# Patient Record
Sex: FEMALE | Race: WHITE | NOT HISPANIC OR LATINO | Employment: FULL TIME | URBAN - METROPOLITAN AREA
[De-identification: names, ages, dates, MRNs, and addresses within clinical notes are randomized per-mention and may not be internally consistent; named-entity substitution may affect disease eponyms.]

---

## 2017-02-13 ENCOUNTER — HOSPITAL ENCOUNTER (OUTPATIENT)
Dept: LAB | Facility: CLINIC | Age: 62
Discharge: HOME | End: 2017-02-13
Admitting: INTERNAL MEDICINE
Payer: COMMERCIAL

## 2017-02-13 DIAGNOSIS — Z00.5 EXAMINATION OF POTENTIAL DONOR OF ORGAN AND TISSUE: Primary | ICD-10-CM

## 2017-02-13 LAB
ABO + RH BLD: NORMAL
ABO + RH BLD: NORMAL
ALBUMIN SERPL-MCNC: 3.6 G/DL (ref 3.4–5)
ALBUMIN UR-MCNC: NEGATIVE MG/DL
ALP SERPL-CCNC: 136 U/L (ref 40–150)
ALT SERPL W P-5'-P-CCNC: 22 U/L (ref 0–50)
ANION GAP SERPL CALCULATED.3IONS-SCNC: 8 MMOL/L (ref 3–14)
APPEARANCE UR: CLEAR
APTT PPP: 26 SEC (ref 22–37)
AST SERPL W P-5'-P-CCNC: 17 U/L (ref 0–45)
BASOPHILS # BLD AUTO: 0 10E9/L (ref 0–0.2)
BASOPHILS NFR BLD AUTO: 0.2 %
BILIRUB SERPL-MCNC: 0.4 MG/DL (ref 0.2–1.3)
BILIRUB UR QL STRIP: NEGATIVE
BLOOD BANK CMNT PATIENT-IMP: NORMAL
BUN SERPL-MCNC: 20 MG/DL (ref 7–30)
CALCIUM SERPL-MCNC: 8.5 MG/DL (ref 8.5–10.1)
CHLORIDE SERPL-SCNC: 108 MMOL/L (ref 94–109)
CHOLEST SERPL-MCNC: 169 MG/DL
CO2 SERPL-SCNC: 26 MMOL/L (ref 20–32)
COLLECT DURATION TIME UR: 24 H
COLLECT DURATION TIME UR: 24 H
COLOR UR AUTO: YELLOW
CREAT 24H UR-MRATE: 1.27 G/(24.H) (ref 0.8–1.8)
CREAT CL 24H UR+SERPL-VRATE: 106 ML/MIN
CREAT CL/1.73 SQ M 24H UR+SERPL-ARVRAT: 99 ML/MIN/1.7M2 (ref 100–160)
CREAT SERPL-MCNC: 0.83 MG/DL (ref 0.52–1.04)
CREAT SERPL-MCNC: 0.83 MG/DL (ref 0.52–1.04)
CREAT UR-MCNC: 95 MG/DL
CREAT UR-MCNC: 98 MG/DL
DIFFERENTIAL METHOD BLD: NORMAL
EOSINOPHIL # BLD AUTO: 0.2 10E9/L (ref 0–0.7)
EOSINOPHIL NFR BLD AUTO: 3.6 %
ERYTHROCYTE [DISTWIDTH] IN BLOOD BY AUTOMATED COUNT: 14.9 % (ref 10–15)
GFR SERPL CREATININE-BSD FRML MDRD: 70 ML/MIN/1.7M2
GLUCOSE SERPL-MCNC: 96 MG/DL (ref 70–99)
GLUCOSE UR STRIP-MCNC: NEGATIVE MG/DL
HBA1C MFR BLD: 6.2 % (ref 4.3–6)
HCG UR QL: NEGATIVE
HCT VFR BLD AUTO: 40.1 % (ref 35–47)
HDLC SERPL-MCNC: 56 MG/DL
HEIGHT IN CM: 167 CM
HGB BLD-MCNC: 12.9 G/DL (ref 11.7–15.7)
HGB UR QL STRIP: ABNORMAL
IMM GRANULOCYTES # BLD: 0 10E9/L (ref 0–0.4)
IMM GRANULOCYTES NFR BLD: 0.2 %
INR PPP: 0.96 (ref 0.86–1.14)
KETONES UR STRIP-MCNC: NEGATIVE MG/DL
LDLC SERPL CALC-MCNC: 99 MG/DL
LEUKOCYTE ESTERASE UR QL STRIP: ABNORMAL
LYMPHOCYTES # BLD AUTO: 1.4 10E9/L (ref 0.8–5.3)
LYMPHOCYTES NFR BLD AUTO: 25.1 %
MAGNESIUM SERPL-MCNC: 2.1 MG/DL (ref 1.6–2.3)
MCH RBC QN AUTO: 29.1 PG (ref 26.5–33)
MCHC RBC AUTO-ENTMCNC: 32.2 G/DL (ref 31.5–36.5)
MCV RBC AUTO: 91 FL (ref 78–100)
MONOCYTES # BLD AUTO: 0.5 10E9/L (ref 0–1.3)
MONOCYTES NFR BLD AUTO: 8.6 %
MUCOUS THREADS #/AREA URNS LPF: PRESENT /LPF
NEUTROPHILS # BLD AUTO: 3.5 10E9/L (ref 1.6–8.3)
NEUTROPHILS NFR BLD AUTO: 62.3 %
NITRATE UR QL: NEGATIVE
NONHDLC SERPL-MCNC: 113 MG/DL
NRBC # BLD AUTO: 0 10*3/UL
NRBC BLD AUTO-RTO: 0 /100
PH UR STRIP: 5.5 PH (ref 5–7)
PHOSPHATE SERPL-MCNC: 2.9 MG/DL (ref 2.5–4.5)
PLATELET # BLD AUTO: 231 10E9/L (ref 150–450)
POTASSIUM SERPL-SCNC: 3.9 MMOL/L (ref 3.4–5.3)
PROT 24H UR-MRATE: 0.09 G/(24.H) (ref 0.04–0.23)
PROT SERPL-MCNC: 6.9 G/DL (ref 6.8–8.8)
PROT UR-MCNC: 0.07 G/L
PROT/CREAT 24H UR: 0.07 G/G CR (ref 0–0.2)
RBC # BLD AUTO: 4.43 10E12/L (ref 3.8–5.2)
RBC #/AREA URNS AUTO: 1 /HPF (ref 0–2)
SODIUM SERPL-SCNC: 142 MMOL/L (ref 133–144)
SP GR UR STRIP: 1.02 (ref 1–1.03)
SPECIMEN EXP DATE BLD: NORMAL
SPECIMEN VOL UR: 1300 ML
SPECIMEN VOL UR: 1300 ML
SQUAMOUS #/AREA URNS AUTO: 1 /HPF (ref 0–1)
TRIGL SERPL-MCNC: 72 MG/DL
TSH SERPL DL<=0.05 MIU/L-ACNC: 1.32 MU/L (ref 0.4–4)
URATE SERPL-MCNC: 4.3 MG/DL (ref 2.6–6)
URN SPEC COLLECT METH UR: ABNORMAL
UROBILINOGEN UR STRIP-MCNC: NORMAL MG/DL (ref 0–2)
WBC # BLD AUTO: 5.6 10E9/L (ref 4–11)
WBC #/AREA URNS AUTO: 13 /HPF (ref 0–2)

## 2017-02-13 PROCEDURE — 85730 THROMBOPLASTIN TIME PARTIAL: CPT | Performed by: INTERNAL MEDICINE

## 2017-02-13 PROCEDURE — 84100 ASSAY OF PHOSPHORUS: CPT | Performed by: INTERNAL MEDICINE

## 2017-02-13 PROCEDURE — 80061 LIPID PANEL: CPT | Performed by: INTERNAL MEDICINE

## 2017-02-13 PROCEDURE — 83036 HEMOGLOBIN GLYCOSYLATED A1C: CPT | Performed by: INTERNAL MEDICINE

## 2017-02-13 PROCEDURE — 82043 UR ALBUMIN QUANTITATIVE: CPT | Performed by: INTERNAL MEDICINE

## 2017-02-13 PROCEDURE — 81025 URINE PREGNANCY TEST: CPT | Performed by: INTERNAL MEDICINE

## 2017-02-13 PROCEDURE — 85025 COMPLETE CBC W/AUTO DIFF WBC: CPT | Performed by: INTERNAL MEDICINE

## 2017-02-13 PROCEDURE — 81001 URINALYSIS AUTO W/SCOPE: CPT | Performed by: INTERNAL MEDICINE

## 2017-02-13 PROCEDURE — 83735 ASSAY OF MAGNESIUM: CPT | Performed by: INTERNAL MEDICINE

## 2017-02-13 PROCEDURE — 36415 COLL VENOUS BLD VENIPUNCTURE: CPT | Performed by: INTERNAL MEDICINE

## 2017-02-13 PROCEDURE — 86901 BLOOD TYPING SEROLOGIC RH(D): CPT | Performed by: INTERNAL MEDICINE

## 2017-02-13 PROCEDURE — 87086 URINE CULTURE/COLONY COUNT: CPT

## 2017-02-13 PROCEDURE — 00000095 ZZHCL STATISTIC CREATININE CLEARANCE: Performed by: INTERNAL MEDICINE

## 2017-02-13 PROCEDURE — 84443 ASSAY THYROID STIM HORMONE: CPT | Performed by: INTERNAL MEDICINE

## 2017-02-13 PROCEDURE — 84550 ASSAY OF BLOOD/URIC ACID: CPT | Performed by: INTERNAL MEDICINE

## 2017-02-13 PROCEDURE — 85610 PROTHROMBIN TIME: CPT | Performed by: INTERNAL MEDICINE

## 2017-02-13 PROCEDURE — 80053 COMPREHEN METABOLIC PANEL: CPT | Performed by: INTERNAL MEDICINE

## 2017-02-13 PROCEDURE — 81050 URINALYSIS VOLUME MEASURE: CPT | Performed by: INTERNAL MEDICINE

## 2017-02-13 PROCEDURE — 86905 BLOOD TYPING RBC ANTIGENS: CPT | Performed by: INTERNAL MEDICINE

## 2017-02-13 PROCEDURE — 86900 BLOOD TYPING SEROLOGIC ABO: CPT | Performed by: INTERNAL MEDICINE

## 2017-02-13 PROCEDURE — 82610 CYSTATIN C: CPT | Performed by: INTERNAL MEDICINE

## 2017-02-13 PROCEDURE — 84156 ASSAY OF PROTEIN URINE: CPT | Performed by: INTERNAL MEDICINE

## 2017-02-14 LAB
ALBUMIN URINE MG/SPEC: NORMAL MG/SPEC (ref 0–30)
BACTERIA SPEC CULT: NO GROWTH
Lab: NORMAL
MICRO REPORT STATUS: NORMAL
MICROALBUMIN UR-MCNC: <5 MG/L
MICROALBUMIN/CREAT UR: NORMAL MG/G CR (ref 0–25)
SPECIMEN SOURCE: NORMAL

## 2017-02-15 LAB — LAB SCANNED RESULT: NORMAL

## 2017-03-07 ENCOUNTER — HOSPITAL ENCOUNTER (OUTPATIENT)
Dept: LAB | Facility: CLINIC | Age: 62
Discharge: HOME | End: 2017-03-07
Admitting: INTERNAL MEDICINE
Payer: COMMERCIAL

## 2017-03-07 DIAGNOSIS — Z00.5 EXAMINATION OF POTENTIAL DONOR OF ORGAN AND TISSUE: ICD-10-CM

## 2017-03-07 LAB
GLUCOSE 2H P 75 G GLC PO SERPL-MCNC: 148 MG/DL (ref 60–200)
GLUCOSE PRE 75 G GLC PO SERPL-MCNC: 93 MG/DL (ref 60–126)

## 2017-03-07 PROCEDURE — 82950 GLUCOSE TEST: CPT | Performed by: INTERNAL MEDICINE

## 2017-03-07 PROCEDURE — 82947 ASSAY GLUCOSE BLOOD QUANT: CPT | Performed by: INTERNAL MEDICINE

## 2017-03-07 PROCEDURE — 36415 COLL VENOUS BLD VENIPUNCTURE: CPT | Performed by: INTERNAL MEDICINE

## 2017-08-25 ENCOUNTER — HOSPITAL ENCOUNTER (OUTPATIENT)
Dept: LAB | Facility: CLINIC | Age: 62
Discharge: HOME | End: 2017-08-25
Admitting: INTERNAL MEDICINE

## 2017-08-25 DIAGNOSIS — Z00.5 ENCOUNTER FOR EXAMINATION OF POTENTIAL DONOR OF ORGAN AND TISSUE: Primary | ICD-10-CM

## 2017-08-25 LAB
ALBUMIN UR-MCNC: NEGATIVE MG/DL
APPEARANCE UR: CLEAR
BILIRUB UR QL STRIP: NEGATIVE
COLOR UR AUTO: NORMAL
GLUCOSE 1H P 75 G GLC PO SERPL-MCNC: NORMAL MG/DL (ref 60–180)
GLUCOSE 2H P 75 G GLC PO SERPL-MCNC: 99 MG/DL (ref 60–200)
GLUCOSE 2H P 75 G GLC PO SERPL-MCNC: NORMAL MG/DL (ref 60–155)
GLUCOSE P FAST SERPL-MCNC: NORMAL MG/DL (ref 60–95)
GLUCOSE PRE 75 G GLC PO SERPL-MCNC: 91 MG/DL (ref 60–126)
GLUCOSE UR STRIP-MCNC: NEGATIVE MG/DL
HBA1C MFR BLD: 6 % (ref 4.3–6)
HGB UR QL STRIP: NEGATIVE
KETONES UR STRIP-MCNC: NEGATIVE MG/DL
LEUKOCYTE ESTERASE UR QL STRIP: NEGATIVE
NITRATE UR QL: NEGATIVE
PH UR STRIP: 6.5 PH (ref 5–7)
RBC #/AREA URNS AUTO: <1 /HPF (ref 0–2)
SOURCE: NORMAL
SP GR UR STRIP: 1 (ref 1–1.03)
SQUAMOUS #/AREA URNS AUTO: <1 /HPF (ref 0–1)
UROBILINOGEN UR STRIP-MCNC: NORMAL MG/DL (ref 0–2)
WBC #/AREA URNS AUTO: 1 /HPF (ref 0–2)

## 2017-08-25 PROCEDURE — 81001 URINALYSIS AUTO W/SCOPE: CPT | Performed by: INTERNAL MEDICINE

## 2017-08-25 PROCEDURE — 36415 COLL VENOUS BLD VENIPUNCTURE: CPT | Performed by: INTERNAL MEDICINE

## 2017-08-25 PROCEDURE — 82947 ASSAY GLUCOSE BLOOD QUANT: CPT | Performed by: INTERNAL MEDICINE

## 2017-08-25 PROCEDURE — 83036 HEMOGLOBIN GLYCOSYLATED A1C: CPT | Performed by: INTERNAL MEDICINE

## 2017-08-25 PROCEDURE — 82950 GLUCOSE TEST: CPT | Performed by: INTERNAL MEDICINE

## 2020-11-22 ENCOUNTER — HOSPITAL ENCOUNTER (EMERGENCY)
Facility: CLINIC | Age: 65
Discharge: HOME | End: 2020-11-23
Attending: EMERGENCY MEDICINE | Admitting: EMERGENCY MEDICINE
Payer: OTHER GOVERNMENT

## 2020-11-22 ENCOUNTER — APPOINTMENT (OUTPATIENT)
Dept: GENERAL RADIOLOGY | Facility: CLINIC | Age: 65
End: 2020-11-22
Attending: EMERGENCY MEDICINE
Payer: OTHER GOVERNMENT

## 2020-11-22 DIAGNOSIS — S63.617A SPRAIN OF LEFT LITTLE FINGER, UNSPECIFIED SITE OF DIGIT, INITIAL ENCOUNTER: ICD-10-CM

## 2020-11-22 PROCEDURE — 73140 X-RAY EXAM OF FINGER(S): CPT | Mod: LT

## 2020-11-22 PROCEDURE — 99283 EMERGENCY DEPT VISIT LOW MDM: CPT

## 2020-11-22 PROCEDURE — 250N000013 HC RX MED GY IP 250 OP 250 PS 637: Performed by: EMERGENCY MEDICINE

## 2020-11-22 RX ORDER — IBUPROFEN 600 MG/1
600 TABLET, FILM COATED ORAL ONCE
Status: COMPLETED | OUTPATIENT
Start: 2020-11-22 | End: 2020-11-22

## 2020-11-22 RX ADMIN — IBUPROFEN 600 MG: 600 TABLET ORAL at 23:32

## 2020-11-22 ASSESSMENT — ENCOUNTER SYMPTOMS
JOINT SWELLING: 1
WEAKNESS: 0
NUMBNESS: 0

## 2020-11-22 NOTE — ED AVS SNAPSHOT
[facility] Emergency Dept  [address]  [facility], [place] [address]  Phone: [phone]  Fax: [phone]                                    Meryl Plascencia   MRN: [id number]    Department: [facility] Emergency Dept   Date of Visit: 11/22/2020           After Visit Summary Signature Page    I have received my discharge instructions, and my questions have been answered. I have discussed any challenges I see with this plan with the nurse or doctor.    ..........................................................................................................................................  Patient/Patient Representative Signature      ..........................................................................................................................................  Patient Representative Print Name and Relationship to Patient    ..................................................               ................................................  Date                                   Time    ..........................................................................................................................................  Reviewed by Signature/Title    ...................................................              ..............................................  Date                                               Time          22EPIC Rev 08/18

## 2020-11-23 VITALS
SYSTOLIC BLOOD PRESSURE: 136 MMHG | OXYGEN SATURATION: 97 % | HEART RATE: 71 BPM | DIASTOLIC BLOOD PRESSURE: 60 MMHG | TEMPERATURE: 97.6 F | RESPIRATION RATE: 18 BRPM

## 2020-11-23 NOTE — DISCHARGE INSTRUCTIONS
Use nancy tape to help with pain.      Use tylenol and/or ibuprofen for pain or discomfort    Use Oxycodone for severe pain uncontrolled by above medications    Opioid Medication Information  You have been given a prescription for an opioid (narcotic) pain medicine and/or have received a pain medicine while here in the emergency department. These medicines can make you drowsy or impaired. You must not drive, operate dangerous equipment, or engage in any other dangerous activities while taking these medications. If you drive while taking these medications, you could be arrested for DUI, or driving under the influence. Do not drink any alcohol while you are taking these medications.   Opioid pain medications can cause addiction. If you have a history of chemical dependency of any type, you are at a higher risk of becoming addicted to pain medications. Only take these prescribed medications to treat your pain when all other options have been tried. Take it for as short a time and as few doses as possible. Store your pain pills in a secure place, as they are frequently stolen and provide a dangerous opportunity for children or visitors in your house to start abusing these powerful medications. We will not replace any lost or stolen medicine. As soon as your pain is better, you should flush all your remaining medication.   Many prescription pain medications contain Tylenol (acetaminophen), including Vicodin, Tylenol #3, Norco, Lortab, and Percocet. You should not take any extra pills of Tylenol if you are using these prescription medications or you can get very sick. Do not ever take more than 4000 mg of acetaminophen in any 24 hour period.  All opioids tend to cause constipation. Drink plenty of water and eat foods that have a lot of fiber, such as fruits, vegetables, prune juice, apple juice and high fiber cereal. Take a laxative if you don t move your bowels at least every other day. Miralax, Milk of Magnesia, Colace,  or Senna can be used to keep you regular.

## 2020-11-23 NOTE — ED PROVIDER NOTES
History   Chief Complaint:  Hand Injury    HPI  Meryl Plascencia is a 64 year old female who presents to the ED for evaluation of a right finger injury. The patient reports she was getting into a hot tub when she slipped and landed straight down on the pinky of her right hand. She states she heard a crack in her finger when she landed on it. She is unable to bend her finger, but can move it, although it is painful for her. She states the rest of her hand and her wrist are okay. She denies any numbness or weakness. She denies taking any medication prior to arrival in the ED.     Allergies:  Tetanus Antitoxin    Medications:    The patient is not currently taking any prescribed medications.    Past Medical History:    Allergic rhinitis    Past Surgical History:    Tonsillectomy  Retinal detachment repair - left  Lasik - right    Family History:    Skin cancer  Diabetes  Hypertension  Depression  Leukemia  Thyroid disorder     Social History:  Marital Status:   Smoking status: No  Alcohol use: No  Drug use: No  PCP: Physician No Ref-Primary  Presents to the ED with     Review of Systems   Musculoskeletal: Positive for joint swelling.   Neurological: Negative for weakness and numbness.   All other systems reviewed and are negative.      Physical Exam     Patient Vitals for the past 24 hrs:   BP Temp Temp src Pulse Resp SpO2   11/23/20 0010 136/60 -- -- 71 18 97 %   11/22/20 2309 (!) 142/75 97.6  F (36.4  C) Oral 75 18 98 %       Physical Exam    Gen: Resting comfortably   CV: ppi, regular   Resp: speaking in full sentences without any resp distress  Extremity: Left hand there is mild soft tissue swelling diffusely of the left fifth finger.  FDS FDP extensor tendon intact distal sensation perfusion intact.  Remainder the hand is unremarkable  Skin: warm dry well perfused  Neuro: Alert, no gross motor or sensory deficits,  gait stable          Emergency Department Course   Imaging:  Radiology findings were  communicated with the patient who voiced understanding of the findings.    XR Fingers, 2-3 views, left:  Normal joint spaces and alignment. No fracture.     Imaging independently reviewed and agree with radiologist interpretation.      Interventions:  2322: Ibuprofen 600 mg PO     Emergency Department Course:  Past medical records, nursing notes, and vitals reviewed.    2313 I performed an exam of the patient as documented above.     The patient was sent for a finger xray while in the emergency department, results above.     2355 I rechecked the patient and discussed the results of her workup and plans for discharge.     Findings and plan explained to the Patient. Patient discharged home with instructions regarding supportive care, medications, and reasons to return. The importance of close follow-up was reviewed.    I personally reviewed the imaging results with the Patient and answered all related questions prior to discharge.     Impression & Plan    Medical Decision Making:   Meryl Plascencia is a 64 year old female who presents with axial loading injury L small finger.  Fds/fdp/ext intact.  distal perfusion and sensation intact.  Radiograph negative for fracture dislocation.  No subungual hematoma or breaks in the skin.  Discharge home conservative management with nancy tapes and OTCs    Diagnosis:     ICD-10-CM    1. Sprain of left little finger, unspecified site of digit, initial encounter  S63.617A      Disposition:   Discharged to home.    Scribe Disclosure:  IBatool, am serving as a scribe on 11/22/2020 at 11:13 PM to personally document services performed by Zaid Rodriguez MD, based on my observations and the provider's statements to me.   IMar, am serving as a scribe on 11/23/2020 at 12:30 AM to personally document services performed by Zaid Rodriguez MD, based on my observations and the provider's statements to me.             Zaid Rodriguez,  MD  11/23/20 0034

## 2021-08-07 ENCOUNTER — HEALTH MAINTENANCE LETTER (OUTPATIENT)
Age: 66
End: 2021-08-07

## 2021-10-02 ENCOUNTER — HEALTH MAINTENANCE LETTER (OUTPATIENT)
Age: 66
End: 2021-10-02

## 2022-09-03 ENCOUNTER — HEALTH MAINTENANCE LETTER (OUTPATIENT)
Age: 67
End: 2022-09-03

## 2023-01-15 ENCOUNTER — HEALTH MAINTENANCE LETTER (OUTPATIENT)
Age: 68
End: 2023-01-15

## 2023-09-30 ENCOUNTER — HEALTH MAINTENANCE LETTER (OUTPATIENT)
Age: 68
End: 2023-09-30

## 2024-02-17 ENCOUNTER — HEALTH MAINTENANCE LETTER (OUTPATIENT)
Age: 69
End: 2024-02-17

## 2025-05-04 ENCOUNTER — APPOINTMENT (OUTPATIENT)
Dept: MRI IMAGING | Facility: CLINIC | Age: 70
End: 2025-05-04
Attending: EMERGENCY MEDICINE
Payer: COMMERCIAL

## 2025-05-04 ENCOUNTER — HOSPITAL ENCOUNTER (EMERGENCY)
Facility: CLINIC | Age: 70
Discharge: HOME | End: 2025-05-04
Attending: EMERGENCY MEDICINE | Admitting: EMERGENCY MEDICINE
Payer: COMMERCIAL

## 2025-05-04 VITALS
HEART RATE: 80 BPM | OXYGEN SATURATION: 97 % | BODY MASS INDEX: 30.08 KG/M2 | HEIGHT: 66 IN | SYSTOLIC BLOOD PRESSURE: 151 MMHG | WEIGHT: 187.17 LBS | RESPIRATION RATE: 18 BRPM | TEMPERATURE: 97.8 F | DIASTOLIC BLOOD PRESSURE: 92 MMHG

## 2025-05-04 DIAGNOSIS — R20.2 PARESTHESIA OF LEFT ARM: ICD-10-CM

## 2025-05-04 LAB
ALBUMIN SERPL BCG-MCNC: 4.5 G/DL (ref 3.5–5.2)
ALBUMIN UR-MCNC: NORMAL G/DL
ALP SERPL-CCNC: 116 U/L (ref 40–150)
ALT SERPL W P-5'-P-CCNC: 25 U/L (ref 0–50)
ANION GAP SERPL CALCULATED.3IONS-SCNC: 14 MMOL/L (ref 7–15)
APPEARANCE UR: NORMAL
AST SERPL W P-5'-P-CCNC: 25 U/L (ref 0–45)
BASOPHILS # BLD AUTO: 0.1 10E3/UL (ref 0–0.2)
BASOPHILS NFR BLD AUTO: 1 %
BILIRUB SERPL-MCNC: 0.3 MG/DL
BILIRUB UR QL STRIP: NORMAL
BUN SERPL-MCNC: 16.2 MG/DL (ref 8–23)
CALCIUM SERPL-MCNC: 9.6 MG/DL (ref 8.8–10.4)
CHLORIDE SERPL-SCNC: 104 MMOL/L (ref 98–107)
COLOR UR AUTO: NORMAL
CREAT SERPL-MCNC: 0.98 MG/DL (ref 0.51–0.95)
EGFRCR SERPLBLD CKD-EPI 2021: 63 ML/MIN/1.73M2
EOSINOPHIL # BLD AUTO: 0.2 10E3/UL (ref 0–0.7)
EOSINOPHIL NFR BLD AUTO: 2 %
ERYTHROCYTE [DISTWIDTH] IN BLOOD BY AUTOMATED COUNT: 13.2 % (ref 10–15)
GLUCOSE SERPL-MCNC: 156 MG/DL (ref 70–99)
GLUCOSE UR STRIP-MCNC: NORMAL MG/DL
HCO3 SERPL-SCNC: 25 MMOL/L (ref 22–29)
HCT VFR BLD AUTO: 41.6 % (ref 35–47)
HGB BLD-MCNC: 13.7 G/DL (ref 11.7–15.7)
HGB UR QL STRIP: NORMAL
IMM GRANULOCYTES # BLD: 0.1 10E3/UL
IMM GRANULOCYTES NFR BLD: 1 %
INR PPP: 1.01 (ref 0.85–1.15)
KETONES UR STRIP-MCNC: NORMAL MG/DL
LEUKOCYTE ESTERASE UR QL STRIP: NORMAL
LYMPHOCYTES # BLD AUTO: 3.2 10E3/UL (ref 0.8–5.3)
LYMPHOCYTES NFR BLD AUTO: 32 %
MAGNESIUM SERPL-MCNC: 2 MG/DL (ref 1.7–2.3)
MCH RBC QN AUTO: 29.7 PG (ref 26.5–33)
MCHC RBC AUTO-ENTMCNC: 32.9 G/DL (ref 31.5–36.5)
MCV RBC AUTO: 90 FL (ref 78–100)
MONOCYTES # BLD AUTO: 0.6 10E3/UL (ref 0–1.3)
MONOCYTES NFR BLD AUTO: 6 %
MUCOUS THREADS #/AREA URNS LPF: NORMAL /[LPF]
NEUTROPHILS # BLD AUTO: 5.8 10E3/UL (ref 1.6–8.3)
NEUTROPHILS NFR BLD AUTO: 59 %
NITRATE UR QL: NORMAL
NRBC # BLD AUTO: 0 10E3/UL
NRBC BLD AUTO-RTO: 0 /100
PH UR STRIP: NORMAL [PH]
PLATELET # BLD AUTO: 247 10E3/UL (ref 150–450)
POTASSIUM SERPL-SCNC: 4 MMOL/L (ref 3.4–5.3)
PROT SERPL-MCNC: 7 G/DL (ref 6.4–8.3)
PROTHROMBIN TIME: 13.4 SECONDS (ref 11.8–14.8)
RBC # BLD AUTO: 4.61 10E6/UL (ref 3.8–5.2)
RBC URINE: NORMAL
SODIUM SERPL-SCNC: 143 MMOL/L (ref 135–145)
SP GR UR STRIP: NORMAL
SQUAMOUS EPITHELIAL: NORMAL
TROPONIN T SERPL HS-MCNC: 6 NG/L
TSH SERPL DL<=0.005 MIU/L-ACNC: 1.59 UIU/ML (ref 0.3–4.2)
UROBILINOGEN UR STRIP-MCNC: NORMAL MG/DL
WBC # BLD AUTO: 9.9 10E3/UL (ref 4–11)
WBC URINE: NORMAL

## 2025-05-04 PROCEDURE — 93005 ELECTROCARDIOGRAM TRACING: CPT

## 2025-05-04 PROCEDURE — 36415 COLL VENOUS BLD VENIPUNCTURE: CPT | Performed by: EMERGENCY MEDICINE

## 2025-05-04 PROCEDURE — 81003 URINALYSIS AUTO W/O SCOPE: CPT | Performed by: EMERGENCY MEDICINE

## 2025-05-04 PROCEDURE — 84484 ASSAY OF TROPONIN QUANT: CPT | Performed by: EMERGENCY MEDICINE

## 2025-05-04 PROCEDURE — 85610 PROTHROMBIN TIME: CPT | Performed by: EMERGENCY MEDICINE

## 2025-05-04 PROCEDURE — 255N000002 HC RX 255 OP 636: Performed by: EMERGENCY MEDICINE

## 2025-05-04 PROCEDURE — 99285 EMERGENCY DEPT VISIT HI MDM: CPT | Mod: 25

## 2025-05-04 PROCEDURE — 85004 AUTOMATED DIFF WBC COUNT: CPT | Performed by: EMERGENCY MEDICINE

## 2025-05-04 PROCEDURE — 84443 ASSAY THYROID STIM HORMONE: CPT | Performed by: EMERGENCY MEDICINE

## 2025-05-04 PROCEDURE — 70549 MR ANGIOGRAPH NECK W/O&W/DYE: CPT

## 2025-05-04 PROCEDURE — 83735 ASSAY OF MAGNESIUM: CPT | Performed by: EMERGENCY MEDICINE

## 2025-05-04 PROCEDURE — 70553 MRI BRAIN STEM W/O & W/DYE: CPT

## 2025-05-04 PROCEDURE — 70544 MR ANGIOGRAPHY HEAD W/O DYE: CPT | Mod: XS

## 2025-05-04 PROCEDURE — 80053 COMPREHEN METABOLIC PANEL: CPT | Performed by: EMERGENCY MEDICINE

## 2025-05-04 PROCEDURE — A9585 GADOBUTROL INJECTION: HCPCS | Performed by: EMERGENCY MEDICINE

## 2025-05-04 RX ORDER — GADOBUTROL 604.72 MG/ML
10 INJECTION INTRAVENOUS ONCE
Status: COMPLETED | OUTPATIENT
Start: 2025-05-04 | End: 2025-05-04

## 2025-05-04 RX ADMIN — GADOBUTROL 10 ML: 604.72 INJECTION INTRAVENOUS at 16:42

## 2025-05-04 ASSESSMENT — ACTIVITIES OF DAILY LIVING (ADL)
ADLS_ACUITY_SCORE: 41

## 2025-05-04 ASSESSMENT — COLUMBIA-SUICIDE SEVERITY RATING SCALE - C-SSRS
2. HAVE YOU ACTUALLY HAD ANY THOUGHTS OF KILLING YOURSELF IN THE PAST MONTH?: NO
6. HAVE YOU EVER DONE ANYTHING, STARTED TO DO ANYTHING, OR PREPARED TO DO ANYTHING TO END YOUR LIFE?: NO
1. IN THE PAST MONTH, HAVE YOU WISHED YOU WERE DEAD OR WISHED YOU COULD GO TO SLEEP AND NOT WAKE UP?: NO

## 2025-05-04 NOTE — ED TRIAGE NOTES
Pt presents to ED with left arm tingling and weakness. States on Friday she had an episode of dizziness and nausea. This eventually passed. Yesterday after about 5 pm she developed weakness and tingling to the left arm. Symptoms have persisted since that time. Pt also reports she had diarrhea yesterday.

## 2025-05-04 NOTE — ED PROVIDER NOTES
"  Emergency Department Note      History of Present Illness     Chief Complaint   Numbness      HPI   Meryl Plascencia is a 69 year old female who presents with weakness. 2 days ago the patient developed sudden light-headedness, dizziness, diaphoresis, and nausea while sitting at her computer. She then got the the floor due to fear of having a syncopal episode. Her symptoms resolved after 10 minutes, no loss of consciousness, but her nausea persisted throughout the night. Yesterday she woke up feeling normal but noticed some left sided weakness and decreased fine motor skills some time after 1700. The hand weakness is described as feeling clumsy but not dropping things or unable to  things. She feels it is more fine motor skills. She mentions having some trouble with intermittent blurred vision for the past few months, nothing new today. She denies any chest pain, shortness of breath, headache, numbness, tingling. No dizziness, light-headedness today.     Independent Historian   None    Review of External Notes   none    Past Medical History     Medical History and Problem List   Allergic rhinitis     Medications   The patient is not taking any routine medications     Surgical History   Tonsillectomy   Hip replacement   Vitrectomy parsplana, scleral buckle    Physical Exam     Patient Vitals for the past 24 hrs:   BP Temp Pulse Resp SpO2 Height Weight   05/04/25 1550 (!) 154/77 -- -- -- -- -- --   05/04/25 1548 -- 97.8  F (36.6  C) 102 18 97 % 1.676 m (5' 6\") 84.9 kg (187 lb 2.7 oz)     Physical Exam  Constitutional:       Appearance: She is well-developed.   HENT:      Right Ear: External ear normal.      Left Ear: External ear normal.      Mouth/Throat:      Mouth: Mucous membranes are moist.      Pharynx: Oropharynx is clear. No oropharyngeal exudate or posterior oropharyngeal erythema.   Eyes:      General: No scleral icterus.     Extraocular Movements: Extraocular movements intact.      " Conjunctiva/sclera: Conjunctivae normal.      Pupils: Pupils are equal, round, and reactive to light.   Cardiovascular:      Rate and Rhythm: Normal rate and regular rhythm.      Heart sounds: Normal heart sounds. No murmur heard.     No friction rub. No gallop.   Pulmonary:      Effort: Pulmonary effort is normal. No respiratory distress.      Breath sounds: Normal breath sounds. No stridor. No wheezing, rhonchi or rales.   Abdominal:      General: Bowel sounds are normal. There is no distension.      Palpations: Abdomen is soft. There is no mass.      Tenderness: There is no abdominal tenderness.   Musculoskeletal:         General: Normal range of motion.      Right lower leg: No edema.      Left lower leg: No edema.   Skin:     General: Skin is warm and dry.      Capillary Refill: Capillary refill takes less than 2 seconds.      Findings: No rash.   Neurological:      General: No focal deficit present.      Mental Status: She is alert.      Cranial Nerves: No cranial nerve deficit.      Gait: Gait normal.      Comments: Speech clear. Gait normal. Dec light touch sensation to L hand and forearm. Strong  bilaterally. Normal strength in BUE with intact interosseous muscle strength testing. Normal BLE strength.            Diagnostics     Lab Results   Labs Ordered and Resulted from Time of ED Arrival to Time of ED Departure   COMPREHENSIVE METABOLIC PANEL - Abnormal       Result Value    Sodium 143      Potassium 4.0      Carbon Dioxide (CO2) 25      Anion Gap 14      Urea Nitrogen 16.2      Creatinine 0.98 (*)     GFR Estimate 63      Calcium 9.6      Chloride 104      Glucose 156 (*)     Alkaline Phosphatase 116      AST 25      ALT 25      Protein Total 7.0      Albumin 4.5      Bilirubin Total 0.3     INR - Normal    INR 1.01      PT 13.4     TROPONIN T, HIGH SENSITIVITY - Normal    Troponin T, High Sensitivity 6     TSH WITH FREE T4 REFLEX - Normal    TSH 1.59     MAGNESIUM - Normal    Magnesium 2.0     CBC  WITH PLATELETS AND DIFFERENTIAL    WBC Count 9.9      RBC Count 4.61      Hemoglobin 13.7      Hematocrit 41.6      MCV 90      MCH 29.7      MCHC 32.9      RDW 13.2      Platelet Count 247      % Neutrophils 59      % Lymphocytes 32      % Monocytes 6      % Eosinophils 2      % Basophils 1      % Immature Granulocytes 1      NRBCs per 100 WBC 0      Absolute Neutrophils 5.8      Absolute Lymphocytes 3.2      Absolute Monocytes 0.6      Absolute Eosinophils 0.2      Absolute Basophils 0.1      Absolute Immature Granulocytes 0.1      Absolute NRBCs 0.0     TROPONIN T, HIGH SENSITIVITY       Imaging   MR Brain w/o & w Contrast   Final Result   IMPRESSION:   MRI BRAIN:   1.  No evidence of acute intracranial hemorrhage, mass effect, or infarction.      MRA HEAD:   1.  No evidence of pathologic vascular occlusion or saccular aneurysm within the visualized intrarenal circulation by MR angiography.      MRA NECK:   1.  No evidence of hemodynamically significant stenosis within either internal carotid artery within the neck.   2.  No MR evidence of arterial dissection.      MRA Angiogram Head w/o Contrast   Final Result   IMPRESSION:   MRI BRAIN:   1.  No evidence of acute intracranial hemorrhage, mass effect, or infarction.      MRA HEAD:   1.  No evidence of pathologic vascular occlusion or saccular aneurysm within the visualized intrarenal circulation by MR angiography.      MRA NECK:   1.  No evidence of hemodynamically significant stenosis within either internal carotid artery within the neck.   2.  No MR evidence of arterial dissection.      MRA Angiogram Neck w/o & w Contrast   Final Result   IMPRESSION:   MRI BRAIN:   1.  No evidence of acute intracranial hemorrhage, mass effect, or infarction.      MRA HEAD:   1.  No evidence of pathologic vascular occlusion or saccular aneurysm within the visualized intrarenal circulation by MR angiography.      MRA NECK:   1.  No evidence of hemodynamically significant stenosis  within either internal carotid artery within the neck.   2.  No MR evidence of arterial dissection.          EKG   ECG results from 05/04/25   EKG 12-lead, tracing only     Value    Systolic Blood Pressure     Diastolic Blood Pressure     Ventricular Rate 91    Atrial Rate 91    WA Interval 154    QRS Duration 90        QTc 440    P Axis 60    R AXIS 15    T Axis 37    Interpretation ECG      Sinus rhythm  Normal ECG  No previous ECGs available       Independent Interpretation   None    ED Course      Medications Administered   Medications   gadobutrol (GADAVIST) injection 10 mL (10 mLs Intravenous $Given 5/4/25 1642)   sodium chloride (PF) 0.9% PF flush 100 mL (100 mLs Intravenous $Given 5/4/25 1642)       Procedures   Procedures     Discussion of Management   Neurology,   Stroke neurology    ED Course   ED Course as of 05/04/25 2158   Sun May 04, 2025   1552 I initially assessed the patient and obtained the above history and physical exam.    1836 I rechecked the patient    1911 I talked with stroke neurology about the patient and plan of care. They directed me to speak to general neurology about the patient    1928 I talked with neurology about the patient and plan of care   1931 I rechecked the patient who declined an MRI C-spine and would like to follow up out patient        Additional Documentation  None    Medical Decision Making / Diagnosis     CMS Diagnoses: None    MIPS       None    MDM   Meryl Plascencia is a 69 year old female who presents with above symptoms.  Patient describing numbness in the hand as well as fine motor weakness in the left hand and arm as well.  I do not appreciate significant weakness compared to the right side.  Patient has no speech issue.  Given the concern with the findings, MRI was obtained.  There is no signs of stroke.  I did consult general neurology given the lack of finding right now for her symptoms.  Patient does not have any neck pain to suggest  cervical radiculopathy.  Although neurology did recommend MRI C spine for possible symptoms related to spinal cord findings that could cause her symptoms.  Neurology did not feel that this would be an emergent MRI that need to be done today although it could be offered as an option.  Patient states that she does not want to do any further imaging tonight as she would prefer to get as an outpatient.  We will refer her to neurology for follow-up.  I put in an request for MRI to be done as an outpatient as well.  She can also reach out to [facility] to obtain similar consult and evaluation through their system.  Return precaution discussed with her.  She is comfortable with the plan.    Disposition   The patient was discharged.     Diagnosis     ICD-10-CM    1. Paresthesia of left arm  R20.2 MRI Cervical spine w & w/o contrast               Scribe Disclosure:  I, Blayne Shelby, am serving as a scribe at 4:04 PM on 5/4/2025 to document services personally performed by Charla Castillo MD based on my observations and the provider's statements to me.        Charla Castillo MD  05/04/25 6418

## 2025-05-04 NOTE — ED NOTES
"  [facility]    Stroke Telephone Note    I was called by Charla Castillo on 05/04/25 regarding patient Meryl Plascencia. The patient is a 69 year old female with no known PMHx presents with 2 days of dizziness(lightheaded), L hand numbness and clumsy.     Vitals  BP: (!) 154/77   Pulse: 102   Resp: 18   Temp: 97.8  F (36.6  C)   Weight: 84.9 kg (187 lb 2.7 oz)    Imaging Findings    MRI BRAIN:  1.  No evidence of acute intracranial hemorrhage, mass effect, or infarction.     MRA HEAD:  1.  No evidence of pathologic vascular occlusion or saccular aneurysm within the visualized intrarenal circulation by MR angiography.     MRA NECK:  1.  No evidence of hemodynamically significant stenosis within either internal carotid artery within the neck.  2.  No MR evidence of arterial dissection.    Impression    L hand weakness/numbness      Recommendations  No further stroke work up warranted     General Neurology eval for further eval of L arm weakness/numbness    My recommendations are based on the information provided over the phone by Meryl Plascencia's in-person providers. They are not intended to replace the clinical judgment of her in-person providers. I was not requested to personally see or examine the patient at this time.     The Stroke Staff is Dr. Vargas.    Jim Meléndez MD  Vascular Neurology Fellow    To page me or covering stroke neurology team member, click here: [facility]  Choose \"On Call\" tab at top, then select \"NEUROLOGY/ALL SITES\" from middle drop-down box, press Enter, then look for \"stroke\" or \"telestroke\" for your site.   "

## 2025-05-05 LAB
ATRIAL RATE - MUSE: 91 BPM
DIASTOLIC BLOOD PRESSURE - MUSE: NORMAL MMHG
INTERPRETATION ECG - MUSE: NORMAL
P AXIS - MUSE: 60 DEGREES
PR INTERVAL - MUSE: 154 MS
QRS DURATION - MUSE: 90 MS
QT - MUSE: 358 MS
QTC - MUSE: 440 MS
R AXIS - MUSE: 15 DEGREES
SYSTOLIC BLOOD PRESSURE - MUSE: NORMAL MMHG
T AXIS - MUSE: 37 DEGREES
VENTRICULAR RATE- MUSE: 91 BPM

## 2025-05-05 NOTE — DISCHARGE INSTRUCTIONS
MRI to be done as outpatient. Referral placed. Get results from your doctor  Neurology phone number provided above. You may see [facility] neurology as well

## 2025-05-11 ENCOUNTER — HEALTH MAINTENANCE LETTER (OUTPATIENT)
Age: 70
End: 2025-05-11